# Patient Record
Sex: MALE | Race: WHITE
[De-identification: names, ages, dates, MRNs, and addresses within clinical notes are randomized per-mention and may not be internally consistent; named-entity substitution may affect disease eponyms.]

---

## 2019-01-17 LAB
ABSOLUTE BASOPHILS: 0 THOU/UL (ref 0–0.2)
ABSOLUTE EOSINOPHILS: 0.2 THOU/UL (ref 0–0.7)
ABSOLUTE MONOCYTES: 0.3 THOU/UL (ref 0–1.2)
ALBUMIN SERPL-MCNC: 3.9 G/DL (ref 3.4–5)
ALP SERPL-CCNC: 99 U/L (ref 46–116)
ALT SERPL-CCNC: 23 U/L (ref 30–65)
ANION GAP SERPL CALC-SCNC: 6 MMOL/L (ref 7–16)
APTT BLD: 26.8 SECONDS (ref 25–31.3)
AST SERPL-CCNC: 16 U/L (ref 15–37)
BASOPHILS NFR BLD AUTO: 1 %
BILIRUB SERPL-MCNC: 1.3 MG/DL
BUN SERPL-MCNC: 10 MG/DL (ref 7–18)
CALCIUM SERPL-MCNC: 9.1 MG/DL (ref 8.5–10.1)
CHLORIDE SERPL-SCNC: 99 MMOL/L (ref 98–107)
CO2 SERPL-SCNC: 31 MMOL/L (ref 21–32)
CREAT SERPL-MCNC: 1 MG/DL (ref 0.6–1.3)
EOSINOPHIL NFR BLD: 3.3 %
ESR (SEDRATE): 5 MM/HR (ref 0–20)
GLUCOSE SERPL-MCNC: 99 MG/DL (ref 70–99)
GRANULOCYTES NFR BLD MANUAL: 68.8 %
HCT VFR BLD CALC: 42.4 % (ref 42–52)
HGB BLD-MCNC: 14.3 GM/DL (ref 14–18)
INR PPP: 1
LYMPHOCYTES # BLD: 1 THOU/UL (ref 0.8–5.3)
LYMPHOCYTES NFR BLD AUTO: 20.6 %
MCH RBC QN AUTO: 28.4 PG (ref 26–34)
MCHC RBC AUTO-ENTMCNC: 33.8 G/DL (ref 28–37)
MCV RBC: 84 FL (ref 80–100)
MONOCYTES NFR BLD: 6.3 %
MPV: 6.9 FL. (ref 7.2–11.1)
NEUTROPHILS # BLD: 3.3 THOU/UL (ref 1.6–8.1)
NUCLEATED RBCS: 0 /100WBC
PLATELET COUNT*: 282 THOU/UL (ref 150–400)
POTASSIUM SERPL-SCNC: 4.4 MMOL/L (ref 3.5–5.1)
PROT SERPL-MCNC: 7.3 G/DL (ref 6.4–8.2)
PROTHROMBIN TIME: 10.7 SECONDS (ref 9.2–11.5)
RBC # BLD AUTO: 5.05 MIL/UL (ref 4.5–6)
RDW-CV: 13.7 % (ref 10.5–14.5)
SODIUM SERPL-SCNC: 136 MMOL/L (ref 136–145)
WBC # BLD AUTO: 4.8 THOU/UL (ref 4–11)

## 2019-01-18 NOTE — EKG
[place] [address]
Phone:  [phone]                     ELECTROCARDIOGRAM REPORT      
_______________________________________________________________________________
 
Name:       SABRINA KUMAR               Room:                      PRE IN  
[facility].#:  [id number]      Account #:      [id number]  
Admission:               Attend Phys:    HEIDY Carter
Discharge:               Date of Birth:  [birth year]  
         Report #: [id number]
    [id number]
_______________________________________________________________________________
THIS REPORT FOR:  //name//                      
 
                          [facility]
                                       
Test Date:    2019               Test Time:    09:06:06
Pat Name:     SABRINA KUMAR           Department:   
Patient ID:   [id number]            Room:          
Gender:       M                        Technician:   
:          1940               Requested By: Titi Moore
Order Number: [id number]    Reading MD:   Alton Montero
                                 Measurements
Intervals                              Axis          
Rate:         59                       P:            0
PA:           203                      QRS:          57
QRSD:         110                      T:            40
QT:           431                                    
QTc:          427                                    
                           Interpretive Statements
Sinus rhythm
Borderline low voltage, extremity leads
RSR' in V1 or V2, right VCD 
Compared to ECG 2014 14:06:44
RSR' in V1 or V2 now present
Sinus bradycardia no longer present
 
Electronically Signed On 2019 10:19:01 CST by Alton Montero
[email or web address]
 
 
 
 
 
 
 
 
 
 
 
 
 
 
 
 
  <ELECTRONICALLY SIGNED>
                                           By: Alton Montero MD, [facility]     
  19     1019
D: 01/17/19 09   _____________________________________
T: 19 09   Alton Montero MD, FAC       /EPI

## 2019-01-28 ENCOUNTER — HOSPITAL ENCOUNTER (INPATIENT)
Dept: HOSPITAL 96 - M.TBA | Age: 79
LOS: 1 days | Discharge: HOME | DRG: 470 | End: 2019-01-29
Attending: INTERNAL MEDICINE | Admitting: INTERNAL MEDICINE
Payer: MEDICARE

## 2019-01-28 VITALS — DIASTOLIC BLOOD PRESSURE: 65 MMHG | SYSTOLIC BLOOD PRESSURE: 146 MMHG

## 2019-01-28 VITALS — SYSTOLIC BLOOD PRESSURE: 138 MMHG | DIASTOLIC BLOOD PRESSURE: 75 MMHG

## 2019-01-28 VITALS — SYSTOLIC BLOOD PRESSURE: 111 MMHG | DIASTOLIC BLOOD PRESSURE: 64 MMHG

## 2019-01-28 VITALS — WEIGHT: 180 LBS | BODY MASS INDEX: 25.77 KG/M2 | HEIGHT: 70 IN

## 2019-01-28 VITALS — SYSTOLIC BLOOD PRESSURE: 147 MMHG | DIASTOLIC BLOOD PRESSURE: 87 MMHG

## 2019-01-28 DIAGNOSIS — M16.11: Primary | ICD-10-CM

## 2019-01-28 DIAGNOSIS — K44.9: ICD-10-CM

## 2019-01-28 DIAGNOSIS — I10: ICD-10-CM

## 2019-01-28 DIAGNOSIS — K21.9: ICD-10-CM

## 2019-01-28 DIAGNOSIS — J98.11: ICD-10-CM

## 2019-01-28 DIAGNOSIS — D62: ICD-10-CM

## 2019-01-28 DIAGNOSIS — Z98.42: ICD-10-CM

## 2019-01-28 DIAGNOSIS — F17.210: ICD-10-CM

## 2019-01-28 DIAGNOSIS — Z86.73: ICD-10-CM

## 2019-01-28 DIAGNOSIS — Z98.41: ICD-10-CM

## 2019-01-28 DIAGNOSIS — Z96.1: ICD-10-CM

## 2019-01-28 DIAGNOSIS — H91.90: ICD-10-CM

## 2019-01-28 PROCEDURE — 0SR903Z REPLACEMENT OF RIGHT HIP JOINT WITH CERAMIC SYNTHETIC SUBSTITUTE, OPEN APPROACH: ICD-10-PCS | Performed by: ORTHOPAEDIC SURGERY

## 2019-01-29 VITALS — DIASTOLIC BLOOD PRESSURE: 80 MMHG | SYSTOLIC BLOOD PRESSURE: 140 MMHG

## 2019-01-29 VITALS — SYSTOLIC BLOOD PRESSURE: 132 MMHG | DIASTOLIC BLOOD PRESSURE: 69 MMHG

## 2019-01-29 VITALS — SYSTOLIC BLOOD PRESSURE: 140 MMHG | DIASTOLIC BLOOD PRESSURE: 80 MMHG

## 2019-01-29 VITALS — SYSTOLIC BLOOD PRESSURE: 127 MMHG | DIASTOLIC BLOOD PRESSURE: 64 MMHG

## 2019-01-29 LAB
HCT VFR BLD CALC: 33.3 % (ref 42–52)
HGB BLD-MCNC: 11.9 GM/DL (ref 14–18)

## 2019-01-29 NOTE — OP
[facility] 
[address] [place]  [address]                    OPERATIVE REPORT              
_______________________________________________________________________________
 
Name:       JOSÉSABRINADENISE FLOYD        Room:           [address] [place]  
M.R.#:  [id number]      Account #:      [id number]  
Admission:  01/28/19     Attend Phys:    HEIDY Carter
Discharge:               Date of Birth:  [birth year]  
         Report #: [id number]
                                                                     [id number]  
_______________________________________________________________________________
THIS REPORT FOR:  //name//                      
 
CC: Titi Montelongo
 
DICTATED BY: Marbin Shah DO
 
DATE OF SERVICE:  01/28/2019
 
 
PREOPERATIVE DIAGNOSES:
1.  Right hip advanced degenerative joint disease.
2.  Previous back surgery with instrumentation.
 
POSTOPERATIVE DIAGNOSES:
1.  Right hip advanced degenerative joint disease.
2.  Previous back surgery with instrumentation.
 
PROCEDURE:  Right total hip arthroplasty utilizing a dual mobility implant.
 
SURGEON:  Titi Moore DO
 
FIRST ASSISTANT:  Marbin Shah DO
 
SECOND ASSISTANT:  Bryan Valladares DO
 
ANESTHESIA:  General with a capsular block.
 
ESTIMATED BLOOD LOSS:  350 mL.  None was returned via Cell Saver.
 
SPECIMENS REMOVED:  None.
 
COMPLICATIONS:  None.
 
CONDITION:  Stable.
 
DISPOSITION:  To PACU, to be admitted to orthopedic floor.
 
ORTHOPEDIC IMPLANTS:  The Biomet total hip arthroplasty system with the
following components:
1.  A size 15 high offset Microplasty femoral stem.
2.  A 62 mm G7 acetabular shell.
3.  Dual mobility bearing with a 28 mm ceramic head.
4.  A standard neck taper adapter.
5.  Two bone screws.
 
 
 
[address] [place]  [address]                    OPERATIVE REPORT              
_______________________________________________________________________________
 
Name:       SABRINA KUMAR        Room:           [address] [place]  
[facility].#:  [id number]      Account #:      [id number]  
Admission:  01/28/19     Attend Phys:    HEIDY Carter
Discharge:               Date of Birth:  [birth year]  
         Report #: [id number]
                                                                     [id number]  
_______________________________________________________________________________
 
INDICATIONS:  The patient is a pleasant 78-year-old male who has been followed
in the outpatient Orthopedic Clinic regarding longstanding right hip pain.  He
has a known degenerative joint disease of the right hip.  He has failed
conservative care consisting of NSAID, analgesia, activity modifications,
attempted weight loss.  He has continued to be symptomatic despite conservative
measures.  We discussed risks, benefits, complications, and alternatives to
total hip arthroplasty with the patient in detail.  Risks include but are not
limited to blood loss, DVT, PE, damage to neurovascular structures, leg length
inequality, dislocation, periprosthetic fracture, failure of orthopedic implant,
need for repeat surgery, and complications of anesthesia.  The patient expressed
understanding and wished to proceed.  The patient has had previous back surgery
with instrumentation and fusion.  This put him at increased risk for
postoperative dislocation and instability.  For this reason, we discussed with
the patient the likelihood of utilizing a dual mobility system for added
stability.
 
DESCRIPTION OF PROCEDURE:  The patient was transferred to the Operating Room and
placed on the [place] table in supine position.  He was given the benefit of
general anesthesia.  The bilateral feet were placed in traction boots on the
traction table.  The right hip area was then prepped and draped in the usual
sterile fashion.  A timeout was then taken to confirm the appropriate patient
identification, operative site and procedure to be performed.  All in the room
were in agreement with the timeout.
 
A skin incision was performed approximately 2 fingerbreadths distal and 2
fingerbreadths lateral to the ASIS.  Dissection was carried down sharply through
the skin and subcutaneous tissue to the level of the iliotibial band.  A new
scalpel blade was then used to incise the iliotibial band.  The vessels were
then encountered and cauterized using Aquamantys.  Dissection was then carried
down with a Bovie to the level of the joint capsule.  Appropriate retractors
were placed.  The capsule was then treated using the Aquamantys cautery.  The
Bovie was then used to perform our capsulotomy.
 
A saw blade was then used to perform our neck cut.  Retractors were in place to
protect the surrounding structures.  The neck cut was performed in a napkin ring
fashion.  Bone tenaculums were then used to remove the neck and head.  We then
began preparation of her acetabular shell.  Remaining soft tissue interposed
into the acetabulum was removed.  We then reamed the acetabulum sequentially up
to a size 61 reamer.  The final 62 mm acetabular shell was then implanted into
place using the alignment guide to determine our appropriate positioning.  Two
bone screws were then placed followed by the dual mobility liner.
 
Attention was then addressed to the femur.  The right lower extremity was placed
in hip extension and abduction and adduction.  Soft tissue structures were
released off the proximal femur to allow for appropriate exposure.  We then
 
 
 
[place] [address]                    OPERATIVE REPORT              
_______________________________________________________________________________
 
Name:       SABRINA KUMAR        Room:           [address] [place]  
M.R.#:  [id number]      Account #:      [id number]  
Admission:  01/28/19     Attend Phys:    HEIDY Carter
Discharge:               Date of Birth:  [birth year]  
         Report #: [id number]
                                                                     [id number]  
_______________________________________________________________________________
placed our retractors appropriately.  The box osteotome was used to gain initial
access to the proximal femur followed by the rattail rasp as a canal finder. 
The femur was then broached sequentially to a size 15 mm stem.  Once the size 15
mm stem was in place, a trial head was placed and the hip was reduced.  C-arm
images were obtained with the trial components in place, which confirmed
appropriate length and positioning of her femoral stem.  At that point, the boot
was taken out of the traction table and taken through a full range of motion and
confirmed appropriate stability without any evidence of subluxation or
dislocation.  The trial components were then removed.  The hip was thoroughly
irrigated.  The hip was sprinkled with vancomycin powder.  The final stem and
femoral head were implanted.  Another set of x-rays were obtained and again
confirmed appropriate positioning of all implants including the femur and the
acetabular shell.  The hip was again taken through range of motion and verified
continued stability with the final implants.
 
The hip was once again irrigated.  IT band was then closed using 3-0 Stratafix
suture.  At this point, through the IT band topical TXA was applied.  Skin was
closed using 2-0 Monocryl in inverted fashion.  3-0 Stratafix and skin glue were
used to close the skin layer.  Sterile Mepilex dressing was applied.  The
patient was transferred to PACU in stable condition.  Needle and sponge counts
were correct at the end of procedure x 2.
 
 
 
 
 
 
 
 
 
 
 
 
 
 
 
 
 
 
 
 
 
 
 
<ELECTRONICALLY SIGNED>
                                        By:  Carmelo George DO              
01/29/19     0703
D: 01/28/19 1012_______________________________________
T: 01/28/19 1133Robleonard Moore DO               /nt

## 2019-06-27 ENCOUNTER — HOSPITAL ENCOUNTER (EMERGENCY)
Dept: HOSPITAL 96 - M.ERS | Age: 79
Discharge: HOME | End: 2019-06-27
Payer: MEDICARE

## 2019-06-27 VITALS — BODY MASS INDEX: 25.05 KG/M2 | WEIGHT: 175 LBS | HEIGHT: 70 IN

## 2019-06-27 VITALS — SYSTOLIC BLOOD PRESSURE: 156 MMHG | DIASTOLIC BLOOD PRESSURE: 77 MMHG
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
## 2019-06-27 DIAGNOSIS — S50.862A: Primary | ICD-10-CM

## 2019-06-27 DIAGNOSIS — Z86.73: ICD-10-CM

## 2019-06-27 DIAGNOSIS — L08.9: ICD-10-CM

## 2019-06-27 DIAGNOSIS — K21.9: ICD-10-CM

## 2019-06-27 DIAGNOSIS — I10: ICD-10-CM

## 2019-06-27 DIAGNOSIS — L03.114: ICD-10-CM

## 2019-06-27 DIAGNOSIS — Z96.643: ICD-10-CM
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
